# Patient Record
Sex: MALE | Race: OTHER | HISPANIC OR LATINO | Employment: FULL TIME | URBAN - METROPOLITAN AREA
[De-identification: names, ages, dates, MRNs, and addresses within clinical notes are randomized per-mention and may not be internally consistent; named-entity substitution may affect disease eponyms.]

---

## 2018-09-28 ENCOUNTER — APPOINTMENT (OUTPATIENT)
Dept: RADIOLOGY | Facility: MEDICAL CENTER | Age: 48
End: 2018-09-28
Attending: EMERGENCY MEDICINE
Payer: COMMERCIAL

## 2018-09-28 ENCOUNTER — HOSPITAL ENCOUNTER (EMERGENCY)
Facility: MEDICAL CENTER | Age: 48
End: 2018-09-28
Attending: EMERGENCY MEDICINE
Payer: COMMERCIAL

## 2018-09-28 VITALS
SYSTOLIC BLOOD PRESSURE: 119 MMHG | BODY MASS INDEX: 27.27 KG/M2 | DIASTOLIC BLOOD PRESSURE: 89 MMHG | OXYGEN SATURATION: 96 % | HEIGHT: 67 IN | WEIGHT: 173.72 LBS | HEART RATE: 76 BPM | RESPIRATION RATE: 12 BRPM | TEMPERATURE: 99 F

## 2018-09-28 DIAGNOSIS — S09.90XA CLOSED HEAD INJURY, INITIAL ENCOUNTER: ICD-10-CM

## 2018-09-28 PROCEDURE — 70450 CT HEAD/BRAIN W/O DYE: CPT

## 2018-09-28 PROCEDURE — 99284 EMERGENCY DEPT VISIT MOD MDM: CPT

## 2018-09-28 ASSESSMENT — PAIN SCALES - GENERAL: PAINLEVEL_OUTOF10: 2

## 2018-09-28 NOTE — ED NOTES
Pt reports large box with wood falling from a leaning position and hitting him in the right side of the face. Pt denies significant pain at the time, did not pursue medical tx for injury at the time. Pt reports intermittent headaches since the event. Pt also reports slight lightheadedness. States he cannot sleep on his back because he feels like he will somehow be blocking blood flow.

## 2018-09-28 NOTE — ED TRIAGE NOTES
Chief Complaint   Patient presents with   • T-5000 Head Injury     Pt was at work and a box a tall box hit his face on the right side. no loc.     Pt Informed regarding triage process and verbalized understanding to inform triage tech or RN for any changes in condition. Placed in lobby.

## 2018-09-28 NOTE — ED PROVIDER NOTES
ED Provider Note    HPI: Patient is a 47-year-old male who presented to the emergency department September 20, 2018 at 3:14 PM with a chief complaint of headache.    Patient states that while at work he was hit in the right side of his head with a heavy box of wood.  The patient did not pursue medical treatment initially (this happened a couple of weeks ago) but has had persistent headache since that time.  No loss of consciousness or visual disturbance.  No neck pain.  No other somatic    Review of Systems:Positive for headache intermittent lightheadedness negative for visual disturbance vomiting or any other problem    Past medical/surgical history: Reflux disease anxiety    Medications: None    Allergies: None    Social History: 30 cans of beer per week patient smokes a few cigarettes daily      Physical exam: Constitutional: Well-developed well-nourished male awake alert  Vital signs: Temperature 99.0 pulse 88 respirations 14 blood pressure 130/91 pulse oximetry 97%  Musculoskeletal:  no  pain with palpitation or movement of muscle, bone or joint , no obvious musculoskeletal deformities identified.  Neurologic: alert and awake answers questions appropriately. Moves all four extremities independently, no gross focal abnormalities identified. Normal strength and motor.  Skin: no rash or lesion seen, no palpable dermatologic lesions identified.  Psychiatric: Patient appear to be slightly anxious.  He was not delusional or hallucinating    Medical decision making: Given the persistence of headache after the head injury a CT scan of the head is obtained to rule out intracranial bleed or other injury.  No acute abnormalities were seen.    Patient be discharged.  He will follow-up through occupational health.  This may represent a slowly improving concussion type syndrome.  He does not appear to be systemically ill or toxic at this time and his imaging studies are unrevealing    Patient is carefully counseled return to  ED immediately for visual disturbance worsening pain or any other problems.    Patient verbalized understanding of these instructions and states he will comply    Impression closed head injury

## 2018-09-28 NOTE — LETTER
"  FORM C-4:  EMPLOYEE’S CLAIM FOR COMPENSATION/ REPORT OF INITIAL TREATMENT  EMPLOYEE’S CLAIM - PROVIDE ALL INFORMATION REQUESTED   First Name  Elio Last Name  Lashaun Blunt Birthdate             Age  1970 47 y.o. 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 (if applicable)  N/A    How did this injury or occupational disease occur? Be specific and answer in detail. Use additional sheet if necessary)  I was explaining something to a new toño, and then I turned around to leave and a long heavy box fell on my face.   If you believe that you have an occupational disease, when did you first have knowledge of the disability and it relationship to your employment?  N/A Witnesses to the Accident  N/A     Nature of Injury or Occupational Disease  Workers' Compensation  Part(s) of Body Injured or Affected  Facial Bones, N/A, N/A    I certify that the above is true and correct to the best of my knowledge and that I have provided this information in order " to obtain the benefits of [place]  I hereby authorize any physician, chiropractor, surgeon, practitioner, or other person, any hospital, including [facility] or [facility] hospital, any medical service organization, any insurance company, or other institution or organization to release to each other, any medical or other information, including benefits paid or payable, pertinent to this injury or disease, except information relative to diagnosis, treatment and/or counseling for AIDS, psychological conditions, alcohol or controlled substances, for which I must give specific authorization.  A Photostat of this authorization shall be as valid as the original.   Date  09/28/2018 Place  [facility] Employee’s Signature   THIS REPORT MUST BE COMPLETED AND MAILED WITHIN 3 WORKING DAYS OF TREATMENT   Place  [facility], EMERGENCY DEPT  Name of Facility   [facility]   Date  9/28/2018 Diagnosis  (S09.90XA) Closed head injury, initial encounter, Active Is there evidence the injured employee was under the influence of alcohol and/or another controlled substance at the time of accident?   Hour  5:55 PM Description of Injury or Disease  Closed head injury, initial encounter No   Treatment  Follow up for recheck  Have you advised the patient to remain off work five days or more?         No   X-Ray Findings  Negative   If Yes   From Date    To Date      From information given by the employee, together with medical evidence, can you directly connect this injury or occupational disease as job incurred?  Yes If No, is the employee capable of: Full Duty  Yes Modified Duty      Is additional medical care by a physician indicated?  Yes If Modified Duty, Specify any Limitations / Restrictions        Do you know of any previous injury or disease contributing to this  "condition or occupational disease?  No   Date  9/28/2018 Print Doctor’s Name  Kitty Jean Carlos SILVERMAN certify the employer’s copy of this form was mailed on:   Address  [address] [address] [phone] Insurer’s Use Only   [place] [facility]  [address]    Provider’s Tax ID Number    Telephone  Dept: [phone]    Doctor’s Signature  e-JEAN CARLOS Madison M.D. Degree   MD    Original - TREATING PHYSICIAN OR CHIROPRACTOR   Pg 2-Insurer/TPA   Pg 3-Employer   Pg 4-Employee                                                                                                  Form C-4 (rev01/03)     BRIEF DESCRIPTION OF RIGHTS AND BENEFITS  (Pursuant to [place] 616C.[place])    Notice of Injury or Occupational Disease (Incident Report Form C-1): If an injury or occupational disease (OD) arises out of and in the course of employment, you must provide written notice to your employer as soon as practicable, but no later than 7 days after the accident or OD. Your employer shall maintain a sufficient supply of the required forms.    Claim for Compensation (Form C-4): If medical treatment is sought, the form C-4 is available at the place of initial treatment. A completed \"Claim for Compensation\" (Form C-4) must be filed within 90 days after an accident or OD. The treating physician or chiropractor must, within 3 working days after treatment, complete and mail to the employer, the employer's insurer and third-party , the Claim for Compensation.    Medical Treatment: If you require medical treatment for your on-the-job injury or OD, you may be required to select a physician or chiropractor from a list provided by your workers’ compensation insurer, if it has contracted with an Organization for Managed Care (MCO) or Preferred Provider Organization (PPO) or providers of health care. If your employer has not entered into a contract with an MCO or PPO, you may select a physician or chiropractor from the " Panel of Physicians and Chiropractors. Any medical costs related to your industrial injury or OD will be paid by your insurer.    Temporary Total Disability (TTD): If your doctor has certified that you are unable to work for a period of at least 5 consecutive days, or 5 cumulative days in a 20-day period, or places restrictions on you that your employer does not accommodate, you may be entitled to TTD compensation.    Temporary Partial Disability (TPD): If the wage you receive upon reemployment is less than the compensation for TTD to which you are entitled, the insurer may be required to pay you TPD compensation to make up the difference. TPD can only be paid for a maximum of 24 months.    Permanent Partial Disability (PPD): When your medical condition is stable and there is an indication of a PPD as a result of your injury or OD, within 30 days, your insurer must arrange for an evaluation by a rating physician or chiropractor to determine the degree of your PPD. The amount of your PPD award depends on the date of injury, the results of the PPD evaluation and your age and wage.    Permanent Total Disability (PTD): If you are medically certified by a treating physician or chiropractor as permanently and totally disabled and have been granted a PTD status by your insurer, you are entitled to receive monthly benefits not to exceed 66 2/3% of your average monthly wage. The amount of your PTD payments is subject to reduction if you previously received a PPD award.    Vocational Rehabilitation Services: You may be eligible for vocational rehabilitation services if you are unable to return to the job due to a permanent physical impairment or permanent restrictions as a result of your injury or occupational disease.    Transportation and Per Armando Reimbursement: You may be eligible for travel expenses and per armando associated with medical treatment.  Reopening: You may be able to reopen your claim if your condition worsens  after claim closure.    Appeal Process: If you disagree with a written determination issued by the insurer or the insurer does not respond to your request, you may appeal to the Department of Administration, , by following the instructions contained in your determination letter. You must appeal the determination within 70 days from the date of the determination letter at [address], [address], [place], [place] [address], or [address], [address], [place], [place] [address]. If you disagree with the  decision, you may appeal to the Department of Administration, . You must file your appeal within 30 days from the date of the  decision letter at [address], [address], [place], [place] [address], or [address], [address], [place], [place] [address]. If you disagree with a decision of an , you may file a petition for judicial review with the District Court. You must do so within 30 days of the Appeal Officer’s decision. You may be represented by an  at your own expense or you may contact the [facility] for possible representation.    [place]  for Injured Workers (NAIW): If you disagree with a  decision, you may request that NAIW represent you without charge at an  Hearing. For information regarding denial of benefits, you may contact the NA at: [address], [address], [place] [address], [phone], or [address], [address], [place] [address], [phone]    To File a Complaint with the Division: If you wish to file a complaint with the  of the Division of Industrial Relations (DIR), please contact the Workers’ Compensation Section, [address], [address], [place], [place] [address], telephone [phone], or [address], [place] [address], telephone [phone].    For  assistance with Workers’ Compensation Issues: you may contact the Office of the Governor Consumer Health Assistance, [address] [place] [address], [place] [id number], [place] [address], Toll Free 1-676.660.3046, Web site: http://[email or web address].[email or web address]., E-mail [email or web address].                                                                                                                                                                               __________________________________________________________________                                    _________________            Employee Name / Signature                                                                                                                            Date                                       D-2 (rev. 10/07)

## 2019-03-11 ENCOUNTER — OFFICE VISIT (OUTPATIENT)
Dept: MEDICAL GROUP | Facility: MEDICAL CENTER | Age: 49
End: 2019-03-11
Payer: COMMERCIAL

## 2019-03-11 VITALS
RESPIRATION RATE: 14 BRPM | WEIGHT: 180 LBS | HEART RATE: 76 BPM | TEMPERATURE: 98.7 F | HEIGHT: 67 IN | BODY MASS INDEX: 28.25 KG/M2 | SYSTOLIC BLOOD PRESSURE: 112 MMHG | OXYGEN SATURATION: 98 % | DIASTOLIC BLOOD PRESSURE: 70 MMHG

## 2019-03-11 DIAGNOSIS — H53.9 CHANGE IN VISION: ICD-10-CM

## 2019-03-11 DIAGNOSIS — H57.13 PAIN OF BOTH EYES: ICD-10-CM

## 2019-03-11 DIAGNOSIS — Z13.29 SCREENING FOR THYROID DISORDER: ICD-10-CM

## 2019-03-11 DIAGNOSIS — Z13.21 ENCOUNTER FOR VITAMIN DEFICIENCY SCREENING: ICD-10-CM

## 2019-03-11 DIAGNOSIS — Z13.220 SCREENING, LIPID: ICD-10-CM

## 2019-03-11 DIAGNOSIS — H11.001 PTERYGIUM OF RIGHT EYE: ICD-10-CM

## 2019-03-11 DIAGNOSIS — S09.90XD INJURY OF HEAD, SUBSEQUENT ENCOUNTER: ICD-10-CM

## 2019-03-11 DIAGNOSIS — G44.309 POST-TRAUMATIC HEADACHE, NOT INTRACTABLE, UNSPECIFIED CHRONICITY PATTERN: ICD-10-CM

## 2019-03-11 DIAGNOSIS — Z56.6 STRESS AT WORK: ICD-10-CM

## 2019-03-11 PROCEDURE — 99214 OFFICE O/P EST MOD 30 MIN: CPT | Performed by: NURSE PRACTITIONER

## 2019-03-11 SDOH — HEALTH STABILITY - MENTAL HEALTH: OTHER PHYSICAL AND MENTAL STRAIN RELATED TO WORK: Z56.6

## 2019-03-11 NOTE — PROGRESS NOTES
"CC: Headache (head injury x few months ago and seen in ER and told ok. But has continous bothersome headache)        HPI:     Elio presents today for the following:  Last seen in 2016    1. Injury of head, subsequent encounter/. Post-traumatic headache, not intractable, unspecified chronicity pattern  Patient states about 6 months ago he was hit on the right side of his cheeks/face with a box at work.  He states he had a pain that was rotating in general places of his head every day for over 2 weeks so he went to the emergency room.  This was done at [facility] and I do have access to records in which she had a negative/normal CT of the head.  He was discharged and encouraged to see occupational health/Workmen's Comp. however did not do so.  He tells me that the pain is in different places sometimes right, sometimes left, sometimes frontal, sometimes at the back of the neck.  He states that 4 days after he was in the emergency room in September his headache completely resolved for a total of 2 months or more.  Then return for 4 days, then resolved and repeated pattern to where he had pain a few days ago, now gone for the last 2 days.    He did not have any loss of consciousness..  Headaches have not been persistent.    Did verify at the beginning of the appointment today that he did not want to be seen in Workmen's Comp.  He denied this and states he would rather pursue it with our office today.    3. Change in visionPterygium of right eyePain of both eyes  Patient seems to have a lot of concern over the fact that he had difficulty with reading.  States normally he looks at his phone or television and he had no problem however recently he had some \"burning \"and generalized fatigue of both eyes after short amount of time.  He has seen an optometrist around 6 months ago and states he had a normal exam, no glasses were prescribed.  They apparently talked about his procedure on the right side.  Okay thanks he denies " "specifically that his vision has changed, no blurred vision, just frequent discomfort of both eyes.     9. Stress at work  Patient states that he has had increased stress at work and he feels this may be more related to his headaches he had recently.      No current outpatient prescriptions on file.     No current facility-administered medications for this visit.      Social History   Substance Use Topics   • Smoking status: Former Smoker     Packs/day: 0.10     Years: 1.00   • Smokeless tobacco: Never Used   • Alcohol use 15.0 oz/week     30 Cans of beer per week      Comment: 12 beers on the weekends     I reviewed patients allergies, problem list and medications today in [facility].    ROS: Any/all pertinent positives listed in the HPI, otherwise all others reviewed are negative today.      /70 (BP Location: Left arm, Patient Position: Sitting, BP Cuff Size: Adult)   Pulse 76   Temp 37.1 °C (98.7 °F) (Temporal)   Resp 14   Ht 1.702 m (5' 7\")   Wt 81.6 kg (180 lb)   SpO2 98%   BMI 28.19 kg/m²     Exam:    Gen: Alert and oriented, No apparent distress. WDWN  Psych: A+Ox3, normal affect and mood  Skin: Warm, dry and intact. Good turgor   No rashes in visible areas.  Eye: Conjunctiva clear, lids normal  ENMT: Lips without lesions, good dentition  Lungs: Clear to auscultation bilaterally, no rales or rhonchi   Unlabored respiratory effort.   CV: Regular rate and rhythm, S1, S2. No murmurs.   No Edema   bilateral hands 5 out of 5, PERRLA, extraocular movements normal symmetrical bilaterally.  Noted procedure him on the right eye only.  Normal gait    Assessment and Plan.   48 y.o. male with the following issues.    1. Injury of head, subsequent encounter/ Post-traumatic headache, not intractable, unspecified chronicity pattern  Long discussion with patient.  I currently do not feel given his history and presentation that his current headaches are related to his facial/head injury 6 months ago.  Especially " given he had a complete resolution of his headaches for several months.  I do believe it could be related to stress as the patient voiced himself.  We did discuss an MRI of the head and currently he wishes to not pursue this as he wants to see if this resolves when stress at work resolved.  I think this is totally understandable.  We will continue to monitor  - Comp Metabolic Panel; Future  - CBC WITH DIFFERENTIAL; Future    3. Screening, lipid  Ordered  - Comp Metabolic Panel; Future  - Lipid Profile; Future    4. Screening for thyroid disorder  Ordered  - TSH; Future    5. Encounter for vitamin deficiency screening  Ordered  - VITAMIN D,25 HYDROXY; Future    6. Change in vision/ Pterygium of right eye//Pain of both eyes  Stable.  Referral placed.  Given information on particular M  - REFERRAL TO OPHTHALMOLOGY    9. Stress at work  I think patient may be correct that this is more of his current trigger for headaches versus his head injury 6 months ago

## 2019-08-20 ENCOUNTER — OFFICE VISIT (OUTPATIENT)
Dept: MEDICAL GROUP | Facility: MEDICAL CENTER | Age: 49
End: 2019-08-20
Payer: COMMERCIAL

## 2019-08-20 ENCOUNTER — HOSPITAL ENCOUNTER (OUTPATIENT)
Dept: RADIOLOGY | Facility: MEDICAL CENTER | Age: 49
End: 2019-08-20
Attending: NURSE PRACTITIONER
Payer: COMMERCIAL

## 2019-08-20 VITALS
BODY MASS INDEX: 27.15 KG/M2 | HEIGHT: 67 IN | DIASTOLIC BLOOD PRESSURE: 64 MMHG | SYSTOLIC BLOOD PRESSURE: 120 MMHG | OXYGEN SATURATION: 98 % | RESPIRATION RATE: 14 BRPM | WEIGHT: 173 LBS | HEART RATE: 71 BPM | TEMPERATURE: 98.8 F

## 2019-08-20 DIAGNOSIS — M54.6 CHRONIC BILATERAL THORACIC BACK PAIN: ICD-10-CM

## 2019-08-20 DIAGNOSIS — R51.9 NONINTRACTABLE HEADACHE, UNSPECIFIED CHRONICITY PATTERN, UNSPECIFIED HEADACHE TYPE: ICD-10-CM

## 2019-08-20 DIAGNOSIS — G89.29 CHRONIC BILATERAL THORACIC BACK PAIN: ICD-10-CM

## 2019-08-20 PROCEDURE — 99214 OFFICE O/P EST MOD 30 MIN: CPT | Performed by: NURSE PRACTITIONER

## 2019-08-20 PROCEDURE — 72072 X-RAY EXAM THORAC SPINE 3VWS: CPT

## 2019-08-20 RX ORDER — TIZANIDINE 4 MG/1
4 TABLET ORAL
Qty: 20 TAB | Refills: 0 | Status: SHIPPED | OUTPATIENT
Start: 2019-08-20

## 2019-08-20 RX ORDER — IBUPROFEN 800 MG/1
800 TABLET ORAL EVERY 8 HOURS PRN
Qty: 30 TAB | Refills: 0 | Status: SHIPPED | OUTPATIENT
Start: 2019-08-20 | End: 2020-03-16 | Stop reason: SDUPTHER

## 2019-08-20 ASSESSMENT — PATIENT HEALTH QUESTIONNAIRE - PHQ9: CLINICAL INTERPRETATION OF PHQ2 SCORE: 0

## 2019-08-20 NOTE — PROGRESS NOTES
"CC: Back Pain (Middle back / lumbar pain after sleeping, always upon waking. Used to be maybe 4x a week and now every night. Difficult to move in AM. Doesn't bother him much during the day. Sharp pain that spreads out \"like nerves\")        HPI:     Elio presents today for the followin. Nonintractable headache, unspecified chronicity pattern, unspecified headache type  For follow-up from last appointment these have resolved for the last several months.  At least 4 months    2. Chronic bilateral thoracic back pain  Here primarily today stating that for approximately a year he is been having some bilateral mid back pain that is present most frequently when he is laying down at night and wakes up in turn side to side.  He states however over the last 3 months has become daily.  Does not really bother him during the daytime states \"may be 3%\" did try taking 400 mg of ibuprofen for a few doses without any major improvement.  Has not tried any ice heat.  Does wake up and stretches and noticed does not bother his back.  No new injury or trauma to the back.    Current Outpatient Medications   Medication Sig Dispense Refill   • tizanidine (ZANAFLEX) 4 MG Tab Take 1 Tab by mouth at bedtime as needed (back pain; a tnight only, no driving with this medicine.). 20 Tab 0   • ibuprofen (MOTRIN) 800 MG Tab Take 1 Tab by mouth every 8 hours as needed. 30 Tab 0     No current facility-administered medications for this visit.      Social History     Tobacco Use   • Smoking status: Former Smoker     Packs/day: 0.10     Years: 1.00     Pack years: 0.10   • Smokeless tobacco: Never Used   Substance Use Topics   • Alcohol use: Yes     Alcohol/week: 15.0 oz     Types: 30 Cans of beer per week     Comment: 12 beers on the weekends   • Drug use: No     I reviewed patients allergies, problem list and medications today in [facility].    ROS: Any/all pertinent positives listed in the HPI, otherwise all others reviewed are negative today.      BP " "120/64 (BP Location: Left arm, Patient Position: Sitting, BP Cuff Size: Adult)   Pulse 71   Temp 37.1 °C (98.8 °F) (Temporal)   Resp 14   Ht 1.702 m (5' 7\")   Wt 78.5 kg (173 lb)   SpO2 98%   BMI 27.10 kg/m²     Exam:    Gen: Alert and oriented, No apparent distress. WDWN  Psych: A+Ox3, normal affect and mood  Skin: Warm, dry and intact. Good turgor   No rashes in visible areas.  Eye: Conjunctiva clear, lids normal  ENMT: Lips without lesions, good dentition  Lungs: Clear to auscultation bilaterally, no rales or rhonchi   Unlabored respiratory effort.   CV: Regular rate and rhythm, S1, S2. No murmurs.   No Edema  Normal gait  No tenderness with palpation over the spine.  He does show the area just below the scapulas as being where it will start and then will radiate like a band out to both sides symmetrically.  \"Like nerves\"  Negative straight leg raise  DTRs patellar and Achilles symmetrical 2+ bilaterally  Leg strength 5 out of 5, symmetrical      Assessment and Plan.   48 y.o. male with the following issues.    1. Nonintractable headache, unspecified chronicity pattern, unspecified headache type  Resolved    2. Chronic bilateral thoracic back pain  Stable.  X-rays ordered as below.  Trial of tizanidine, discussed to take at night before bed and to not drive this medication.  Trial of ibuprofen full dose and discussed to get the anti-inflammatory effect he should be taking this for at least 3 days, always with food.  He was given some simple back exercises such as extension to try as well.  He will let me know if is not improving  - DX-THORACIC SPINE-WITH SWIMMERS VIEW; Future  - tizanidine (ZANAFLEX) 4 MG Tab; Take 1 Tab by mouth at bedtime as needed (back pain; a tnight only, no driving with this medicine.).  Dispense: 20 Tab; Refill: 0  - ibuprofen (MOTRIN) 800 MG Tab; Take 1 Tab by mouth every 8 hours as needed.  Dispense: 30 Tab; Refill: 0          "

## 2019-10-26 ENCOUNTER — HOSPITAL ENCOUNTER (OUTPATIENT)
Dept: LAB | Facility: MEDICAL CENTER | Age: 49
End: 2019-10-26
Attending: NURSE PRACTITIONER
Payer: COMMERCIAL

## 2019-10-26 DIAGNOSIS — Z13.21 ENCOUNTER FOR VITAMIN DEFICIENCY SCREENING: ICD-10-CM

## 2019-10-26 DIAGNOSIS — Z13.29 SCREENING FOR THYROID DISORDER: ICD-10-CM

## 2019-10-26 DIAGNOSIS — S09.90XD INJURY OF HEAD, SUBSEQUENT ENCOUNTER: ICD-10-CM

## 2019-10-26 DIAGNOSIS — Z13.220 SCREENING, LIPID: ICD-10-CM

## 2019-10-26 DIAGNOSIS — G44.309 POST-TRAUMATIC HEADACHE, NOT INTRACTABLE, UNSPECIFIED CHRONICITY PATTERN: ICD-10-CM

## 2019-10-26 LAB
25(OH)D3 SERPL-MCNC: 28 NG/ML (ref 30–100)
ALBUMIN SERPL BCP-MCNC: 4.1 G/DL (ref 3.2–4.9)
ALBUMIN/GLOB SERPL: 1.2 G/DL
ALP SERPL-CCNC: 86 U/L (ref 30–99)
ALT SERPL-CCNC: 19 U/L (ref 2–50)
ANION GAP SERPL CALC-SCNC: 7 MMOL/L (ref 0–11.9)
AST SERPL-CCNC: 20 U/L (ref 12–45)
BASOPHILS # BLD AUTO: 0.4 % (ref 0–1.8)
BASOPHILS # BLD: 0.02 K/UL (ref 0–0.12)
BILIRUB SERPL-MCNC: 0.8 MG/DL (ref 0.1–1.5)
BUN SERPL-MCNC: 17 MG/DL (ref 8–22)
CALCIUM SERPL-MCNC: 9.4 MG/DL (ref 8.5–10.5)
CHLORIDE SERPL-SCNC: 105 MMOL/L (ref 96–112)
CHOLEST SERPL-MCNC: 184 MG/DL (ref 100–199)
CO2 SERPL-SCNC: 30 MMOL/L (ref 20–33)
CREAT SERPL-MCNC: 0.78 MG/DL (ref 0.5–1.4)
EOSINOPHIL # BLD AUTO: 0.13 K/UL (ref 0–0.51)
EOSINOPHIL NFR BLD: 2.4 % (ref 0–6.9)
ERYTHROCYTE [DISTWIDTH] IN BLOOD BY AUTOMATED COUNT: 42.8 FL (ref 35.9–50)
FASTING STATUS PATIENT QL REPORTED: NORMAL
GLOBULIN SER CALC-MCNC: 3.5 G/DL (ref 1.9–3.5)
GLUCOSE SERPL-MCNC: 97 MG/DL (ref 65–99)
HCT VFR BLD AUTO: 47.6 % (ref 42–52)
HDLC SERPL-MCNC: 43 MG/DL
HGB BLD-MCNC: 15.5 G/DL (ref 14–18)
IMM GRANULOCYTES # BLD AUTO: 0.01 K/UL (ref 0–0.11)
IMM GRANULOCYTES NFR BLD AUTO: 0.2 % (ref 0–0.9)
LDLC SERPL CALC-MCNC: 120 MG/DL
LYMPHOCYTES # BLD AUTO: 1.59 K/UL (ref 1–4.8)
LYMPHOCYTES NFR BLD: 29 % (ref 22–41)
MCH RBC QN AUTO: 30.6 PG (ref 27–33)
MCHC RBC AUTO-ENTMCNC: 32.6 G/DL (ref 33.7–35.3)
MCV RBC AUTO: 94.1 FL (ref 81.4–97.8)
MONOCYTES # BLD AUTO: 0.34 K/UL (ref 0–0.85)
MONOCYTES NFR BLD AUTO: 6.2 % (ref 0–13.4)
NEUTROPHILS # BLD AUTO: 3.4 K/UL (ref 1.82–7.42)
NEUTROPHILS NFR BLD: 61.8 % (ref 44–72)
NRBC # BLD AUTO: 0 K/UL
NRBC BLD-RTO: 0 /100 WBC
PLATELET # BLD AUTO: 226 K/UL (ref 164–446)
PMV BLD AUTO: 9.9 FL (ref 9–12.9)
POTASSIUM SERPL-SCNC: 4.6 MMOL/L (ref 3.6–5.5)
PROT SERPL-MCNC: 7.6 G/DL (ref 6–8.2)
RBC # BLD AUTO: 5.06 M/UL (ref 4.7–6.1)
SODIUM SERPL-SCNC: 142 MMOL/L (ref 135–145)
TRIGL SERPL-MCNC: 107 MG/DL (ref 0–149)
TSH SERPL DL<=0.005 MIU/L-ACNC: 3.97 UIU/ML (ref 0.38–5.33)
WBC # BLD AUTO: 5.5 K/UL (ref 4.8–10.8)

## 2019-10-26 PROCEDURE — 84443 ASSAY THYROID STIM HORMONE: CPT

## 2019-10-26 PROCEDURE — 85025 COMPLETE CBC W/AUTO DIFF WBC: CPT

## 2019-10-26 PROCEDURE — 80061 LIPID PANEL: CPT

## 2019-10-26 PROCEDURE — 36415 COLL VENOUS BLD VENIPUNCTURE: CPT

## 2019-10-26 PROCEDURE — 82306 VITAMIN D 25 HYDROXY: CPT

## 2019-10-26 PROCEDURE — 80053 COMPREHEN METABOLIC PANEL: CPT

## 2020-02-21 ENCOUNTER — HOSPITAL ENCOUNTER (EMERGENCY)
Facility: MEDICAL CENTER | Age: 50
End: 2020-02-21
Attending: EMERGENCY MEDICINE
Payer: COMMERCIAL

## 2020-02-21 ENCOUNTER — APPOINTMENT (OUTPATIENT)
Dept: RADIOLOGY | Facility: MEDICAL CENTER | Age: 50
End: 2020-02-21
Attending: EMERGENCY MEDICINE
Payer: COMMERCIAL

## 2020-02-21 VITALS
HEART RATE: 77 BPM | OXYGEN SATURATION: 100 % | DIASTOLIC BLOOD PRESSURE: 81 MMHG | TEMPERATURE: 98.6 F | SYSTOLIC BLOOD PRESSURE: 125 MMHG | WEIGHT: 181.22 LBS | BODY MASS INDEX: 28.38 KG/M2 | RESPIRATION RATE: 16 BRPM

## 2020-02-21 DIAGNOSIS — R10.31 RIGHT INGUINAL PAIN: ICD-10-CM

## 2020-02-21 PROCEDURE — 99283 EMERGENCY DEPT VISIT LOW MDM: CPT

## 2020-02-21 PROCEDURE — 76857 US EXAM PELVIC LIMITED: CPT

## 2020-02-22 NOTE — ED PROVIDER NOTES
"ED Provider Note    Scribed for Chauncey Aranda M.D. by Maria Elena Reyes. 2/21/2020  4:54 PM    Primary care provider: JOSÉ MIGUEL Ritter  Means of arrival: Walk in   History obtained from: Patient   History limited by: None     CHIEF COMPLAINT  Chief Complaint   Patient presents with   • Groin Pain     Pt was lifting something in his house about 3 weeks ago and felt a pain in his right groin and testicle. Denies any problems urinating.        HPI  Elio Wilks is a 49 y.o. male who presents to the Emergency Department for right groin pain onset 3 weeks ago. The patient states that he was lifting something at his house whole moving when he felt a \"pop\" on his right side. He reports associated buttocks pain. He denies vomiting, diarrhea, abdominal pain, or back pain.     REVIEW OF SYSTEMS  Pertinent positives include groin pain and buttocks pain. Pertinent negatives include no vomiting, diarrhea, abdominal pain, or back pain.     PAST MEDICAL HISTORY   has a past medical history of Anxiety and GERD (gastroesophageal reflux disease).    SURGICAL HISTORY  patient denies any surgical history    SOCIAL HISTORY  Social History     Tobacco Use   • Smoking status: Former Smoker     Packs/day: 0.10     Years: 1.00     Pack years: 0.10   • Smokeless tobacco: Never Used   Substance Use Topics   • Alcohol use: Yes     Alcohol/week: 15.0 oz     Types: 30 Cans of beer per week     Comment: 12 beers on the weekends   • Drug use: No      Social History     Substance and Sexual Activity   Drug Use No       FAMILY HISTORY  Family History   Problem Relation Age of Onset   • Heart Disease Maternal Grandmother         pacemaker       CURRENT MEDICATIONS  Home Medications    **Home medications have not yet been reviewed for this encounter**         ALLERGIES  No Known Allergies    PHYSICAL EXAM  VITAL SIGNS: /76   Pulse 89   Temp 37 °C (98.6 °F) (Temporal)   Resp 16   Wt 82.2 kg (181 lb 3.5 oz)   SpO2 97%   BMI " 28.38 kg/m²     Vital signs reviewed.  Constitutional:  Mild distress.  Cardiovascular: Regular rate and rhythm.   Pulmonary/Chest: Clear.  Abdominal: Soft, nontender.  Musculoskeletal: Tenderness in right inguinal area with obvious bulging.     exam: There is no hernia with Valsalva maneuver.  Normal male circumcised genitalia.  Testes are nontender      RADIOLOGY  US-INGUINAL HERNIA   Final Result      Negative right inguinal ultrasound.           The radiologist's interpretation of all radiological studies have been reviewed by me.    COURSE & MEDICAL DECISION MAKING  Pertinent Labs & Imaging studies reviewed. (See chart for details) The patient's [facility] and past medical  records were reviewed    4:54 PM - Patient seen and examined at bedside. Discussed plan of care with the patient which includes imaging. Gave the patient the opportunity to ask any questions and he is agreeable to plan of care. Ordered US-Inguinal hernia to evaluate his symptoms. The differential diagnoses include but are not limited to: hernia versus muscle strain,.    Ultrasound was negative for hernia.  As was my physical exam.  I told the patient I feel that it is most likely consistent with a muscle strain.  I think that he can follow-up with his primary care physician unless he has an obvious bulge or hernia.    6:26 PM - Patient was reevaluated at bedside. Discussed radiology results with the patient and informed them that he will be discharged home. Patient was given the opportunity to ask any questions. Discussed return precautions and encouraged him to return for any new or worsening symptoms. Patient verbalizes understanding and agreement to this plan of care.     The patient will return for new or worsening symptoms and is stable at the time of discharge.    The patient is referred to a primary physician for blood pressure management, diabetic screening, and for all other preventative health  concerns.    DISPOSITION:  Patient will be discharged home in stable condition.    FOLLOW UP:  Denise Rogers A.P.R.N.  [address]  [address]  [facility] [id number] [phone]    In 1 week        OUTPATIENT MEDICATIONS:  New Prescriptions    No medications on file       FINAL IMPRESSION  1. Right inguinal pain          Maria Elena SILVERMAN (Scribe), am scribing for, and in the presence of, Chauncey Aranda M.D..    Electronically signed by: Maria Elena Reyes (Scribmaranda), 2/21/2020    IChauncey M.D. personally performed the services described in this documentation, as scribed by Maria Elena Reyes in my presence, and it is both accurate and complete. E    The note accurately reflects work and decisions made by me.  Chauncey Aranda M.D.  2/21/2020  8:07 PM

## 2020-02-22 NOTE — ED TRIAGE NOTES
Chief Complaint   Patient presents with   • Groin Pain     Pt was lifting something in his house about 3 weeks ago and felt a pain in his right groin and testicle. Denies any problems urinating.      /76   Pulse 89   Temp 37 °C (98.6 °F) (Temporal)   Resp 16   Wt 82.2 kg (181 lb 3.5 oz)   SpO2 97%   BMI 28.38 kg/m²   Pt placed back in lobby, educated on triage process, and told to inform staff of any change in condition.

## 2020-03-16 ENCOUNTER — OFFICE VISIT (OUTPATIENT)
Dept: MEDICAL GROUP | Facility: MEDICAL CENTER | Age: 50
End: 2020-03-16
Payer: COMMERCIAL

## 2020-03-16 VITALS
HEART RATE: 78 BPM | HEIGHT: 67 IN | SYSTOLIC BLOOD PRESSURE: 112 MMHG | BODY MASS INDEX: 27.62 KG/M2 | RESPIRATION RATE: 14 BRPM | DIASTOLIC BLOOD PRESSURE: 70 MMHG | TEMPERATURE: 98 F | WEIGHT: 176 LBS | OXYGEN SATURATION: 96 %

## 2020-03-16 DIAGNOSIS — R10.31 RIGHT GROIN PAIN: ICD-10-CM

## 2020-03-16 PROCEDURE — 99214 OFFICE O/P EST MOD 30 MIN: CPT | Performed by: NURSE PRACTITIONER

## 2020-03-16 RX ORDER — IBUPROFEN 800 MG/1
800 TABLET ORAL EVERY 8 HOURS PRN
Qty: 30 TAB | Refills: 0 | Status: SHIPPED | OUTPATIENT
Start: 2020-03-16 | End: 2020-03-23

## 2020-03-16 ASSESSMENT — FIBROSIS 4 INDEX: FIB4 SCORE: 0.97

## 2020-03-16 ASSESSMENT — PATIENT HEALTH QUESTIONNAIRE - PHQ9: CLINICAL INTERPRETATION OF PHQ2 SCORE: 0

## 2020-03-16 NOTE — PROGRESS NOTES
CC: Follow-Up (ER for concern of hernia)        HPI:     Elio presents today for the followin. Right groin pain  Here following up from an ER visit approximately 3 weeks ago where he had gone at that point complaining of 3 weeks with right groin pain.  He states it starts just behind the right hip and rotates forward into the groin.  Sometimes down to the testicle.  They did do an ultrasound to rule out hernia-which showed no hernia.  The ER provider nor the patient ever feels a bulge.  He was told likely muscular to follow-up in our office if it persisted.  He states currently that he is continued to have pain although it is mild and intermittent.  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  He states he has used some over-the-counter pain patches which did help but the pain did return.  Has not tried any NSAIDs.  Or heat    Current Outpatient Medications   Medication Sig Dispense Refill   • ibuprofen (MOTRIN) 800 MG Tab Take 1 Tab by mouth every 8 hours as needed for up to 7 days. 30 Tab 0   • tizanidine (ZANAFLEX) 4 MG Tab Take 1 Tab by mouth at bedtime as needed (back pain; a tnight only, no driving with this medicine.). 20 Tab 0     No current facility-administered medications for this visit.      Social History     Tobacco Use   • Smoking status: Former Smoker     Packs/day: 0.10     Years: 1.00     Pack years: 0.10   • Smokeless tobacco: Never Used   Substance Use Topics   • Alcohol use: Yes     Alcohol/week: 15.0 oz     Types: 30 Cans of beer per week     Comment: 12 beers on the weekends   • Drug use: No     I reviewed patients allergies, problem list and medications today in EPIC.    ROS: Any/all pertinent positives listed in the HPI, otherwise all others reviewed are negative today.      /70 (BP Location: Left arm, Patient Position: Sitting, BP Cuff Size:  "Adult)   Pulse 78   Temp 36.7 °C (98 °F) (Temporal)   Resp 14   Ht 1.702 m (5' 7\")   Wt 79.8 kg (176 lb)   SpO2 96%   BMI 27.57 kg/m²     Exam:   Gen: Alert and oriented, No apparent distress. WDWN  Psych: A+Ox3, normal affect and mood  Skin: Warm, dry and intact. Good turgor   No rashes in visible areas.  Eye: Conjunctiva clear, lids normal, right-sided ptyergium  ENMT: Lips without lesions, good dentition  Lungs: Clear to auscultation bilaterally, no rales or rhonchi   Unlabored respiratory effort.   CV: Regular rate and rhythm, S1, S2. No murmurs.   No Edema  Abd: Soft non tender, non distended. Normal active bowel sounds.    No inguinal bulge with Valsalva  Ext: No clubbing, cyanosis, edema.       Assessment and Plan.   49 y.o. male with the following issues.    1. Right groin pain  Suspect muscular.  Unable to reproduce the pain in the office today.  We will do high-dose ibuprofen, which he tolerates well.  He understands to take with food lots of fluids.  Recommend heat and he was given some stretches to do.  If persist we may consider physical therapy.  Patient verbalized understanding  - ibuprofen (MOTRIN) 800 MG Tab; Take 1 Tab by mouth every 8 hours as needed for up to 7 days.  Dispense: 30 Tab; Refill: 0          "

## 2020-07-14 ENCOUNTER — OFFICE VISIT (OUTPATIENT)
Dept: MEDICAL GROUP | Facility: MEDICAL CENTER | Age: 50
End: 2020-07-14
Payer: COMMERCIAL

## 2020-07-14 VITALS
BODY MASS INDEX: 28.09 KG/M2 | TEMPERATURE: 99.9 F | HEIGHT: 67 IN | HEART RATE: 88 BPM | WEIGHT: 179 LBS | SYSTOLIC BLOOD PRESSURE: 136 MMHG | OXYGEN SATURATION: 96 % | DIASTOLIC BLOOD PRESSURE: 78 MMHG

## 2020-07-14 DIAGNOSIS — K62.5 BRIGHT RED RECTAL BLEEDING: ICD-10-CM

## 2020-07-14 DIAGNOSIS — K64.9 HEMORRHOIDS, UNSPECIFIED HEMORRHOID TYPE: ICD-10-CM

## 2020-07-14 DIAGNOSIS — R10.31 RIGHT GROIN PAIN: ICD-10-CM

## 2020-07-14 PROCEDURE — 99213 OFFICE O/P EST LOW 20 MIN: CPT | Performed by: NURSE PRACTITIONER

## 2020-07-14 RX ORDER — HYDROCORTISONE ACETATE 25 MG/1
25 SUPPOSITORY RECTAL EVERY 12 HOURS
Qty: 20 SUPPOSITORY | Refills: 5 | Status: SHIPPED | OUTPATIENT
Start: 2020-07-14

## 2020-07-14 ASSESSMENT — FIBROSIS 4 INDEX: FIB4 SCORE: 0.97

## 2020-07-14 NOTE — PROGRESS NOTES
CC: Hemorrhoids (blood on Tp after BM)        HPI:     Elio presents today for the followin. Bright red rectal bleeding  Here today stating that for about 4 months he has had some anal rectal discomfort.  States initially 4 months ago he would have pain with sitting any sits for up sometimes up to 11 hours and a forklift at work.  States this had resolved within days and then a month later it occurred to him again.  He states that maybe a week ago he noticed a drop of theresa red blood on the toilet paper, some on a normal colored stool and interestingly some on his underwear at the end of the day.  States that several months ago he thought he had little bumps or irritation and he would use over-the-counter Neosporin which resolved it.  Believes he has a hemorrhoid    Denies straining or hard stools    2. Right groin pain  As a follow-up from last visit states that he does intermittently still some pain in his right groin.  He was seen in the ER several months ago for this and had ultrasound showing no hernia.  He states typically this is worsened if he lift something heavy in a work environment.  He denies any bulges or abnormalities on exam.  Currently nontender.    Current Outpatient Medications   Medication Sig Dispense Refill   • hydrocortisone (ANUSOL-HC) 25 MG Suppos Insert 1 Suppository in rectum every 12 hours. 20 Suppository 5   • tizanidine (ZANAFLEX) 4 MG Tab Take 1 Tab by mouth at bedtime as needed (back pain; a tnight only, no driving with this medicine.). 20 Tab 0     No current facility-administered medications for this visit.      Social History     Tobacco Use   • Smoking status: Former Smoker     Packs/day: 0.10     Years: 1.00     Pack years: 0.10   • Smokeless tobacco: Never Used   Substance Use Topics   • Alcohol use: Yes     Alcohol/week: 15.0 oz     Types: 30 Cans of beer per week     Comment: 12 beers on the weekends   • Drug use: No     I reviewed patients allergies, problem list and  "medications today in EPIC.    ROS: Any/all pertinent positives listed in the HPI, otherwise all others reviewed are negative today.      /78 (BP Location: Left arm, Patient Position: Sitting, BP Cuff Size: Adult)   Pulse 88   Temp 37.7 °C (99.9 °F) (Temporal)   Ht 1.702 m (5' 7\")   Wt 81.2 kg (179 lb)   SpO2 96%   BMI 28.04 kg/m²     Exam:    Gen: Alert and oriented, No apparent distress. WDWN  Psych: A+Ox3, normal affect and mood  Skin: Warm, dry and intact. Good turgor   No rashes in visible areas.  Eye: Conjunctiva clear, lids normal  ENMT: Masked  Lungs: Unlabored respiratory effort.   A chaperone was offered to the patient during today's exam. Patient declined chaperone.   No external abnormalities on exam consistent with papules pustules vesicles.  No redness no drainage or odor.  No visible hemorrhoids.  Rectal exam did not reveal any internal hemorrhoids or blood        Assessment and Plan.   49 y.o. male with the following issues.    1. Bright red rectal bleeding/ Hemorrhoids, unspecified hemorrhoid type  Suspect hemorrhoids given patient's description.  None on exam.  May benefit from hydrocortisone suppositories and these were sent to the pharmacy.  Reviewed and make sure he gets adequate fiber and water.  Was given information on hemorrhoids in Maltese.  Recommend cleaning with Tucks pads or wet wipes to avoid irritating the area.    3. Right groin pain  Given abdominal wall exercises to complete review those in the office.  Told not to do the any that cause discomfort.  Icing if bothering him and avoid lifting heavy items as possible          " negative...

## 2020-09-03 ENCOUNTER — OFFICE VISIT (OUTPATIENT)
Dept: MEDICAL GROUP | Facility: MEDICAL CENTER | Age: 50
End: 2020-09-03
Payer: COMMERCIAL

## 2020-09-03 VITALS
HEIGHT: 67 IN | DIASTOLIC BLOOD PRESSURE: 72 MMHG | BODY MASS INDEX: 27.99 KG/M2 | WEIGHT: 178.35 LBS | SYSTOLIC BLOOD PRESSURE: 116 MMHG | HEART RATE: 81 BPM | OXYGEN SATURATION: 96 % | TEMPERATURE: 97.8 F

## 2020-09-03 DIAGNOSIS — K64.1 GRADE II HEMORRHOIDS: ICD-10-CM

## 2020-09-03 PROCEDURE — 99213 OFFICE O/P EST LOW 20 MIN: CPT | Performed by: INTERNAL MEDICINE

## 2020-09-03 ASSESSMENT — FIBROSIS 4 INDEX: FIB4 SCORE: 0.97

## 2020-09-03 NOTE — PROGRESS NOTES
"      CC: Hemorrhoids                                                                                                                                      HPI:   Elio presents today with the following.    1. Grade II hemorrhoids  Presents recently diagnosed with bleeding hemorrhoid.  Symptoms are improved but he still having some pain.  Denies any further blood in the stool.  No fevers or chills no blood in his stool no black stools moving his bowels regularly.      Patient Active Problem List    Diagnosis Date Noted   • LIMA (generalized anxiety disorder) 03/25/2015   • Insomnia 03/25/2015   • GERD (gastroesophageal reflux disease) 03/25/2015       Current Outpatient Medications   Medication Sig Dispense Refill   • hydrocortisone (ANUSOL-HC) 25 MG Suppos Insert 1 Suppository in rectum every 12 hours. 20 Suppository 5   • tizanidine (ZANAFLEX) 4 MG Tab Take 1 Tab by mouth at bedtime as needed (back pain; a tnight only, no driving with this medicine.). (Patient not taking: Reported on 9/3/2020) 20 Tab 0     No current facility-administered medications for this visit.          Allergies as of 09/03/2020   • (No Known Allergies)        ROS: Denies Chest pain, SOB, LE edema.    /72 (BP Location: Right arm, Patient Position: Sitting)   Pulse 81   Temp 36.6 °C (97.8 °F)   Ht 1.702 m (5' 7\")   Wt 80.9 kg (178 lb 5.6 oz)   SpO2 96%   BMI 27.93 kg/m²     Physical Exam:  Gen:         Alert and oriented, No apparent distress.  Neck:        No Lymphadenopathy or Bruits.  Lungs:     Clear to auscultation bilaterally  CV:          Regular rate and rhythm. No murmurs, rubs or gallops.               Ext:          No clubbing, cyanosis, edema.      Assessment and Plan.   49 y.o. male with the following issues.    1. Grade II hemorrhoids  Internal hemorrhoids seem to be improving have recommended medications pads and sitz bath.  If not improving have referred to surgery.  - REFERRAL TO GENERAL SURGERY      "

## 2021-05-06 ENCOUNTER — HOSPITAL ENCOUNTER (OUTPATIENT)
Dept: LAB | Facility: MEDICAL CENTER | Age: 51
End: 2021-05-06
Attending: FAMILY MEDICINE
Payer: COMMERCIAL

## 2021-05-06 LAB
ALBUMIN SERPL BCP-MCNC: 3.9 G/DL (ref 3.2–4.9)
ALBUMIN/GLOB SERPL: 1.1 G/DL
ALP SERPL-CCNC: 97 U/L (ref 30–99)
ALT SERPL-CCNC: 29 U/L (ref 2–50)
ANION GAP SERPL CALC-SCNC: 7 MMOL/L (ref 7–16)
AST SERPL-CCNC: 42 U/L (ref 12–45)
BASOPHILS # BLD AUTO: 0.3 % (ref 0–1.8)
BASOPHILS # BLD: 0.02 K/UL (ref 0–0.12)
BILIRUB SERPL-MCNC: 0.7 MG/DL (ref 0.1–1.5)
BUN SERPL-MCNC: 17 MG/DL (ref 8–22)
CALCIUM SERPL-MCNC: 9 MG/DL (ref 8.5–10.5)
CHLORIDE SERPL-SCNC: 102 MMOL/L (ref 96–112)
CO2 SERPL-SCNC: 31 MMOL/L (ref 20–33)
CREAT SERPL-MCNC: 0.8 MG/DL (ref 0.5–1.4)
EOSINOPHIL # BLD AUTO: 0.2 K/UL (ref 0–0.51)
EOSINOPHIL NFR BLD: 3.4 % (ref 0–6.9)
ERYTHROCYTE [DISTWIDTH] IN BLOOD BY AUTOMATED COUNT: 42.2 FL (ref 35.9–50)
GLOBULIN SER CALC-MCNC: 3.5 G/DL (ref 1.9–3.5)
GLUCOSE SERPL-MCNC: 98 MG/DL (ref 65–99)
HCT VFR BLD AUTO: 48 % (ref 42–52)
HGB BLD-MCNC: 15.5 G/DL (ref 14–18)
IMM GRANULOCYTES # BLD AUTO: 0.02 K/UL (ref 0–0.11)
IMM GRANULOCYTES NFR BLD AUTO: 0.3 % (ref 0–0.9)
LYMPHOCYTES # BLD AUTO: 2.09 K/UL (ref 1–4.8)
LYMPHOCYTES NFR BLD: 35.1 % (ref 22–41)
MCH RBC QN AUTO: 30.2 PG (ref 27–33)
MCHC RBC AUTO-ENTMCNC: 32.3 G/DL (ref 33.7–35.3)
MCV RBC AUTO: 93.6 FL (ref 81.4–97.8)
MONOCYTES # BLD AUTO: 0.55 K/UL (ref 0–0.85)
MONOCYTES NFR BLD AUTO: 9.2 % (ref 0–13.4)
NEUTROPHILS # BLD AUTO: 3.07 K/UL (ref 1.82–7.42)
NEUTROPHILS NFR BLD: 51.7 % (ref 44–72)
NRBC # BLD AUTO: 0 K/UL
NRBC BLD-RTO: 0 /100 WBC
PLATELET # BLD AUTO: 226 K/UL (ref 164–446)
PMV BLD AUTO: 10.5 FL (ref 9–12.9)
POTASSIUM SERPL-SCNC: 4.7 MMOL/L (ref 3.6–5.5)
PROT SERPL-MCNC: 7.4 G/DL (ref 6–8.2)
RBC # BLD AUTO: 5.13 M/UL (ref 4.7–6.1)
SODIUM SERPL-SCNC: 140 MMOL/L (ref 135–145)
TREPONEMA PALLIDUM IGG+IGM AB [PRESENCE] IN SERUM OR PLASMA BY IMMUNOASSAY: NORMAL
WBC # BLD AUTO: 6 K/UL (ref 4.8–10.8)

## 2021-05-06 PROCEDURE — 86780 TREPONEMA PALLIDUM: CPT

## 2021-05-06 PROCEDURE — 36415 COLL VENOUS BLD VENIPUNCTURE: CPT

## 2021-05-06 PROCEDURE — 80053 COMPREHEN METABOLIC PANEL: CPT

## 2021-05-06 PROCEDURE — 85025 COMPLETE CBC W/AUTO DIFF WBC: CPT

## 2021-05-06 PROCEDURE — 87491 CHLMYD TRACH DNA AMP PROBE: CPT

## 2021-05-06 PROCEDURE — 87591 N.GONORRHOEAE DNA AMP PROB: CPT

## 2021-05-07 ENCOUNTER — TELEPHONE (OUTPATIENT)
Dept: MEDICAL GROUP | Facility: PHYSICIAN GROUP | Age: 51
End: 2021-05-07

## 2021-05-07 LAB
C TRACH DNA SPEC QL NAA+PROBE: NEGATIVE
N GONORRHOEA DNA SPEC QL NAA+PROBE: NEGATIVE
SPECIMEN SOURCE: NORMAL

## 2021-05-07 NOTE — TELEPHONE ENCOUNTER
Future Appointments       Provider Department Center    5/14/2021 3:00 PM Benito Betancourt M.D. [facility] [place]        ESTABLISHED PATIENT PRE-VISIT PLANNING     Patient was NOT contacted to complete PVP.     Note: Patient will not be contacted if there is no indication to call.     1.  Reviewed notes from the last few office visits within the medical group: Yes, LOV 09/03/20    2.  If any orders were placed at last visit or intended to be done for this visit (i.e. 6 mos follow-up), do we have Results/Consult Notes?         •  Labs - Labs were not ordered at last office visit.  Note: If patient appointment is for lab review and patient did not complete labs, check with provider if OK to reschedule patient until labs completed.       •  Imaging - Imaging was not ordered at last office visit.       •  Referrals - Referral ordered, patient was seen and consult notes are in chart. Care Teams updated  YES.    3. Is this appointment scheduled as a Hospital Follow-Up? No    4.  Immunizations were updated in Epic using Reconcile Outside Information activity? Yes    5.  Patient is due for the following Health Maintenance Topics:   Health Maintenance Due   Topic Date Due   • COVID-19 Vaccine (1) Never done   • IMM DTaP/Tdap/Td Vaccine (2 - Td) 05/21/2020     6.  AHA (Pulse8) form printed for Provider? N/A

## 2021-05-14 ENCOUNTER — APPOINTMENT (OUTPATIENT)
Dept: MEDICAL GROUP | Facility: PHYSICIAN GROUP | Age: 51
End: 2021-05-14
Payer: COMMERCIAL

## 2022-11-21 ENCOUNTER — HOSPITAL ENCOUNTER (OUTPATIENT)
Dept: RADIOLOGY | Facility: MEDICAL CENTER | Age: 52
End: 2022-11-21
Attending: FAMILY MEDICINE
Payer: COMMERCIAL

## 2022-11-21 DIAGNOSIS — G89.29 OTHER CHRONIC PAIN: ICD-10-CM

## 2022-11-21 DIAGNOSIS — M53.3 DISORDER OF SACRUM: ICD-10-CM

## 2022-11-21 PROCEDURE — 72100 X-RAY EXAM L-S SPINE 2/3 VWS: CPT
